# Patient Record
Sex: FEMALE | Race: BLACK OR AFRICAN AMERICAN | Employment: OTHER | URBAN - METROPOLITAN AREA
[De-identification: names, ages, dates, MRNs, and addresses within clinical notes are randomized per-mention and may not be internally consistent; named-entity substitution may affect disease eponyms.]

---

## 2017-10-24 ENCOUNTER — HOSPITAL ENCOUNTER (EMERGENCY)
Age: 47
Discharge: HOME | End: 2017-10-24
Attending: EMERGENCY MEDICINE
Payer: SELF-PAY

## 2017-10-24 ENCOUNTER — APPOINTMENT (OUTPATIENT)
Dept: GENERAL RADIOLOGY | Age: 47
End: 2017-10-24
Attending: EMERGENCY MEDICINE
Payer: SELF-PAY

## 2017-10-24 ENCOUNTER — APPOINTMENT (OUTPATIENT)
Dept: CT IMAGING | Age: 47
End: 2017-10-24
Attending: EMERGENCY MEDICINE
Payer: SELF-PAY

## 2017-10-24 VITALS
BODY MASS INDEX: 27.94 KG/M2 | TEMPERATURE: 98.1 F | SYSTOLIC BLOOD PRESSURE: 136 MMHG | RESPIRATION RATE: 16 BRPM | OXYGEN SATURATION: 100 % | WEIGHT: 148 LBS | HEART RATE: 108 BPM | HEIGHT: 61 IN | DIASTOLIC BLOOD PRESSURE: 99 MMHG

## 2017-10-24 DIAGNOSIS — S00.83XA FACIAL CONTUSION, INITIAL ENCOUNTER: Primary | ICD-10-CM

## 2017-10-24 DIAGNOSIS — S80.02XA CONTUSION OF LEFT KNEE, INITIAL ENCOUNTER: ICD-10-CM

## 2017-10-24 PROCEDURE — 90471 IMMUNIZATION ADMIN: CPT

## 2017-10-24 PROCEDURE — 73564 X-RAY EXAM KNEE 4 OR MORE: CPT

## 2017-10-24 PROCEDURE — 99281 EMR DPT VST MAYX REQ PHY/QHP: CPT

## 2017-10-24 PROCEDURE — 70450 CT HEAD/BRAIN W/O DYE: CPT

## 2017-10-24 PROCEDURE — 74011250636 HC RX REV CODE- 250/636: Performed by: EMERGENCY MEDICINE

## 2017-10-24 PROCEDURE — 90715 TDAP VACCINE 7 YRS/> IM: CPT | Performed by: EMERGENCY MEDICINE

## 2017-10-24 PROCEDURE — 70486 CT MAXILLOFACIAL W/O DYE: CPT

## 2017-10-24 RX ORDER — LISINOPRIL AND HYDROCHLOROTHIAZIDE 20; 25 MG/1; MG/1
TABLET ORAL DAILY
COMMUNITY

## 2017-10-24 RX ORDER — GABAPENTIN 100 MG/1
100 CAPSULE ORAL 2 TIMES DAILY
COMMUNITY

## 2017-10-24 RX ORDER — METFORMIN HYDROCHLORIDE 1000 MG/1
1000 TABLET ORAL 2 TIMES DAILY WITH MEALS
COMMUNITY

## 2017-10-24 RX ADMIN — TETANUS TOXOID, REDUCED DIPHTHERIA TOXOID AND ACELLULAR PERTUSSIS VACCINE, ADSORBED 0.5 ML: 5; 2.5; 8; 8; 2.5 SUSPENSION INTRAMUSCULAR at 14:45
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
## 2017-10-24 NOTE — LETTER
NOTIFICATION RETURN TO WORK 
 
10/24/2017 4:34 PM 
 
Ms. Alba Astudillo [address] [facility] [address] [place] [id number] To Whom It May Concern: 
 
Alba Astudillo is currently under the care of [address] [place] [address] EMERGENCY DEPT. She will return to work on: 10/25/17 If there are questions or concerns please have the patient contact our office.  
 
 
 
Sincerely, 
 
 
 
 
 
 
 
Berkley Corbett MD
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
## 2017-11-02 ENCOUNTER — OFFICE VISIT (OUTPATIENT)
Dept: OBGYN CLINIC | Age: 47
End: 2017-11-02

## 2017-11-02 ENCOUNTER — HOSPITAL ENCOUNTER (OUTPATIENT)
Dept: LAB | Age: 47
Discharge: HOME | End: 2017-11-02
Payer: SELF-PAY

## 2017-11-02 VITALS
HEIGHT: 61 IN | BODY MASS INDEX: 28.13 KG/M2 | HEART RATE: 81 BPM | DIASTOLIC BLOOD PRESSURE: 87 MMHG | SYSTOLIC BLOOD PRESSURE: 134 MMHG | WEIGHT: 149 LBS

## 2017-11-02 DIAGNOSIS — Z01.419 WELL WOMAN EXAM WITH ROUTINE GYNECOLOGICAL EXAM: Primary | ICD-10-CM

## 2017-11-02 DIAGNOSIS — Z12.39 BREAST CANCER SCREENING: ICD-10-CM

## 2017-11-02 DIAGNOSIS — N90.89 VULVAR MASS: ICD-10-CM

## 2017-11-02 LAB
HCG URINE, QL. (POC): NEGATIVE
VALID INTERNAL CONTROL?: YES

## 2017-11-02 PROCEDURE — 87624 HPV HI-RISK TYP POOLED RSLT: CPT | Performed by: OBSTETRICS & GYNECOLOGY

## 2017-11-02 PROCEDURE — 88175 CYTOPATH C/V AUTO FLUID REDO: CPT | Performed by: OBSTETRICS & GYNECOLOGY

## 2017-11-02 PROCEDURE — 87491 CHLMYD TRACH DNA AMP PROBE: CPT | Performed by: OBSTETRICS & GYNECOLOGY

## 2017-11-02 NOTE — PATIENT INSTRUCTIONS

## 2017-11-02 NOTE — PROGRESS NOTES
Subjective:   50 y.o. female for annual routine Pap and checkup. Patient also here for right vulvar mass x >8 months,enlarging and now with 2 new growing lumps on left vulva. PCP placed patient on abx with no relief. Patient's last menstrual period was 2017. Last pap smear:  2 years ago NILM  Menstrual history: irregular  Dysmenorrhea no  H/o STIs:  no  Social History: single partner, contraception - none. [unfilled]  OB History    Para Term  AB Living   6 5       SAB TAB Ectopic Molar Multiple Live Births              # Outcome Date GA Lbr Jesus/2nd Weight Sex Delivery Anes PTL Lv   6             5 Para            4 Para            3 Para            2 Para            1 Para                   Pertinent past medical hstory: no history of HTN, DVT, CAD, liver disease, migraines; +DM, +smoking    There is no problem list on file for this patient. There are no active problems to display for this patient. Current Outpatient Prescriptions   Medication Sig Dispense Refill    insulin NPH/insulin regular (HUMULIN 70/30) 100 unit/mL (70-30) injection 35 Units by SubCUTAneous route two (2) times a day.  lisinopril-hydroCHLOROthiazide (PRINZIDE, ZESTORETIC) 20-25 mg per tablet Take  by mouth daily.  metFORMIN (GLUCOPHAGE) 1,000 mg tablet Take 1,000 mg by mouth two (2) times daily (with meals).  gabapentin (NEURONTIN) 100 mg capsule Take 100 mg by mouth two (2) times a day. No Known Allergies  Past Medical History:   Diagnosis Date    Diabetes [address] [address] [facility]     Gastrointestinal disorder     acid reflux    Hypertension      History reviewed. No pertinent surgical history. Family History   Problem Relation Age of Onset    Diabetes Mother     Liver Disease Father      Social History   Substance Use Topics    Smoking status: Current Every Day Smoker     Packs/day: 1.00    Smokeless tobacco: Never Used    Alcohol use Yes      Comment: occ        ROS:  Feeling well.  No dyspnea or chest pain on exertion. No abdominal pain, change in bowel habits, black or bloody stools. No urinary tract symptoms. GYN ROS: infrequent menses, no pelvic pain or discharge, no breast pain or new or enlarging lumps on self exam. No neurological complaints. Objective:     Visit Vitals    /87    Pulse 81    Ht 5' 1\" (1.549 m)    Wt 149 lb (67.6 kg)    LMP 07/24/2017    BMI 28.15 kg/m2     The patient appears well, alert, oriented x 3, in no distress. ENT normal.  Neck supple. No adenopathy or thyromegaly. TAMMY. Lungs are clear, good air entry, no wheezes, rhonchi or rales. S1 and S2 normal, no murmurs, regular rate and rhythm. Abdomen soft without tenderness, guarding, mass or organomegaly. Extremities show no edema, normal peripheral pulses. Neurological is normal, no focal findings. BREAST EXAM: right breast normal without mass, skin or nipple changes or axillary nodes, left breast normal without mass, skin or nipple changes or axillary nodes    PELVIC EXAM: VULVA: 4x5 cm firm mass on right vulva, 1x2 cm mass on left vulva, 1x1 cm mass on left vulva. VAGINA: normal appearing vagina with normal color and discharge, no lesions, CERVIX: normal appearing cervix without discharge or lesions, UTERUS: uterus is normal size, shape, consistency and nontender, ADNEXA: normal adnexa in size, nontender and no masses, PAP: Pap smear done today, DNA probe for chlamydia and GC obtained    Assessment/Plan:   well woman  mammogram  pap smear  counseled on breast self exam  additional lab tests per orders    ICD-10-CM ICD-9-CM    1. Well woman exam with routine gynecological exam Z01.419 V72.31 PAP IG, CT-NG TV HPV 16&18,45 ([id number], [id number])      AMB POC URINE PREGNANCY TEST, VISUAL COLOR COMPARISON   2. Vulvar mass N90.9 625.8 REFERRAL TO GYN ONCOLOGY   3. Breast cancer screening Z12.31 V76.10 ANNALEE MAMMO BI SCREENING INCL CAD   .   Vulvar biopsy attempted but patient did not tolerate procedure  Called gyn onc office to notify them of the referral and lack of biopsy. I explained to patient that due to the size of the vulvar mass, I will refer to gyn oncology. Discussed with patient that our office will call for abnormal lab results. Normal results can be reviewed through [facility]. If patient has not received a phone call from our office or there are no results found on Mychart, the patient has been instructed to call our office to follow up on results. I have verbalized the plan of care with patient and the patient expressed understanding.    All questions were answered

## 2017-11-02 NOTE — PROCEDURES
Procedure:  Vulvar biopsy    Performed By:  Senait Cifuentes DO     Assistant:  none    Anesthesia: Local     Procedure Details: The risks,benefits and alternatives  were explained and consent was obtained for the procedure. RBAs also explained. The area was cleansed with Betadine and draped in the usual manner. Local anesthesia with 1% lidocaine with epinephrine was infiltrated into the skin overlying the vulvar mass. There was difficulty infiltrating since the mass was extremely firm. Patient was in severe pain during the administration of local anesthetic. Patient asked to terminate the procedure. Estimated Blood Loss:  None           Complications:  Pain           Condition: stable           Signed By: Senait Cifuentes DO     I have verbalized the plan of care with patient. The patient was given a full opportunity to ask questions and indicated that her questions had been answered.

## 2017-11-07 ENCOUNTER — HOSPITAL ENCOUNTER (OUTPATIENT)
Dept: MAMMOGRAPHY | Age: 47
Discharge: HOME | End: 2017-11-07
Attending: OBSTETRICS & GYNECOLOGY
Payer: SELF-PAY

## 2017-11-07 DIAGNOSIS — Z12.39 BREAST CANCER SCREENING: ICD-10-CM

## 2017-11-07 PROCEDURE — 77067 SCR MAMMO BI INCL CAD: CPT

## 2017-11-07 NOTE — PROGRESS NOTES
Pap NILM. HPV NEG  Repeat pap in 5 years or as clinically indicated. ALFONZO Patton to send letter to patient with above recommendation.

## 2017-11-09 ENCOUNTER — TELEPHONE (OUTPATIENT)
Dept: ONCOLOGY | Age: 47
End: 2017-11-09

## 2017-11-13 NOTE — PROGRESS NOTES
[facility]  [address] [place] [address], [address], [address]  [address] [facility], [address] [place] [address]  [facility]   [phone]  Teresita Lynn DO      Patient ID:  Name:  Alfredo Soares  MRN:  [id number]  :  1970/47 y.o. Date:  2017      HISTORY OF PRESENT ILLNESS:  Alfredo Soares is a 50 y.o.   perimenopausal female referred by Dr. Tanner for vulvar mass. Patient initially presented for WWE, with c/o right vulvar mass that she's noticed for the past 8+ months. Patient also reports new onset of 2 lumps on left vulva. Patient initially saw PCP, who prescribed antibiotics, which were completed with no relief. On exam, 4x5 cm firm mass on right vulva, 1x2 cm mass on left vulva, 1x1 cm mass on left vulva   Biopsy of vulvar mass in office at last visit was attempted, but unsuccessful as patient was unable to tolerate the procedure. Patient denies history of STDs, HPV, abnormal paps. Pt states that she has pain at these sites.      Labs:  17 Pap: sat/neg HPV neg      Imaging  none       ROS:   As above      Patient Active Problem List    Diagnosis Date Noted    Constipation 2015    Elevated troponin 2013    Nausea & vomiting 2013    Dehydration 2013    Substance abuse 2013    DM (diabetes mellitus), secondary uncontrolled ([address] 2013    Active smoker 2013     Past Medical History:   Diagnosis Date    Diabetes ([address]     Gastrointestinal disorder     acid reflux    GERD (gastroesophageal reflux disease)     Hypertension     Ill-defined condition     neuropathy      Past Surgical History:   Procedure Laterality Date    COLONOSCOPY  2015           OB History      Para Term  AB Living    6 5 0 0 0     SAB TAB Ectopic Molar Multiple Live Births    0 0 0 0          Social History   Substance Use Topics    Smoking status: Current Every Day Smoker     Packs/day: 1.00    Smokeless tobacco: Never Used    Alcohol use 1.2 oz/week     2 Cans of beer per week      Comment: occ      Family History   Problem Relation Age of Onset    Diabetes Mother     Liver Disease Father       Current Outpatient Prescriptions   Medication Sig    insulin NPH/insulin regular (HUMULIN 70/30) 100 unit/mL (70-30) injection 35 Units by SubCUTAneous route two (2) times a day.  lisinopril-hydroCHLOROthiazide (PRINZIDE, ZESTORETIC) 20-25 mg per tablet Take  by mouth daily.  metFORMIN (GLUCOPHAGE) 1,000 mg tablet Take 1,000 mg by mouth two (2) times daily (with meals).  gabapentin (NEURONTIN) 100 mg capsule Take 100 mg by mouth two (2) times a day.  diphenhydrAMINE (BENADRYL) 25 mg capsule Take 50 mg by mouth every six (6) hours as needed.  hydrOXYzine (VISTARIL) 25 mg capsule Take 1 Cap by mouth three (3) times daily as needed for Itching.  INSULIN NPH HUM/REG INSULIN HM (HUMULIN 70/30 SC) 25 Units by SubCUTAneous route.  LISINOPRIL PO Take  by mouth.  metFORMIN (GLUCOPHAGE) 1,000 mg tablet Take 1,000 mg by mouth daily.  pregabalin (LYRICA) 75 mg capsule Take 75 mg by mouth daily (after dinner).  aspirin 81 mg chewable tablet Take 81 mg by mouth daily.  esomeprazole (NEXIUM) 20 mg capsule Take 20 mg by mouth daily. Indications: GASTROESOPHAGEAL REFLUX     No current facility-administered medications for this visit. No Known Allergies       OBJECTIVE:    Physical Exam  VITAL SIGNS: Visit Vitals    LMP 07/24/2017      GENERAL PETERSON: in no apparent distress and well developed and well nourished   MUSCULOSKEL: no joint tenderness, deformity or swelling   INTEGUMENT:  warm and dry, no rashes or lesions   ABDOMEN . soft, NT, ND, No masses appreciated   EXTREMITIES: extremities normal, atraumatic, no cyanosis or edema   PELVIC: External genitalia: 4-5 cm firm mass in left vulva.  Two 1 cm masses in left vulva   RECTAL: deferred   CHRISTOPHE SURVEY: Cervical, supraclavicular, axillary and inguinal nodes normal.   NEURO: Grossly normal         IMPRESSION/PLAN:  1. Vulvar Masses   -explained that we need to get tissue diagnosis   -given failed office biopsy will plan for wide local excision of bilateral vulvar masses on 11/29 at [facility]   -surgery counseling today   -all of ms. simpson questions/concerns addressed      The total time spent was 60 minutes regarding this patients diagnosis of vulvar masses and >50% of this time was spent counseling and coordinating care    [address] [place] [address]  Gynecologic Oncology  11/13/20173:19 PM

## 2017-11-14 ENCOUNTER — OFFICE VISIT (OUTPATIENT)
Dept: ONCOLOGY | Age: 47
End: 2017-11-14

## 2017-11-14 VITALS
HEIGHT: 62 IN | DIASTOLIC BLOOD PRESSURE: 85 MMHG | TEMPERATURE: 98.1 F | SYSTOLIC BLOOD PRESSURE: 120 MMHG | HEART RATE: 99 BPM | WEIGHT: 158.2 LBS | OXYGEN SATURATION: 95 % | BODY MASS INDEX: 29.11 KG/M2

## 2017-11-14 DIAGNOSIS — N90.89 VULVAR MASS: Primary | ICD-10-CM

## 2017-11-14 NOTE — PROGRESS NOTES
Juan Urena, a 50 y.o. female,  is here for   Chief Complaint   Patient presents with    Referral Follow Up     Dr. Ni Gonzalez; vulvar mass       Visit Vitals    /85 (BP 1 Location: Right arm, BP Patient Position: Sitting)    Pulse 99    Temp 98.1 °F (36.7 °C) (Oral)    Ht 5' 1.5\" (1.562 m)    Wt 71.8 kg (158 lb 3.2 oz)    SpO2 95%    BMI 29.41 kg/m2       Patient denies any abdominal or pelvic pain. No unusual bleeding, discharge, or irritation. No changes in bladder or bowel habits. 1. Have you been to the ER, urgent care clinic since your last visit? Hospitalized since your last visit? Yes When: October 2017 Where: [facility] ED Reason for visit: Vulvar mass    2. Have you seen or consulted any other health care providers outside of the [address] [place] [address] since your last visit? Include any pap smears or colon screening.  new patient

## 2017-11-27 ENCOUNTER — TELEPHONE (OUTPATIENT)
Dept: ONCOLOGY | Age: 47
End: 2017-11-27

## 2017-11-27 NOTE — TELEPHONE ENCOUNTER
Left voicemail for patient to have testing completed for procedure on 11/29/17. Also called work number but it was the [employer] personal cell phone and he was not at work on 11/27/17.

## 2017-11-28 ENCOUNTER — ANESTHESIA EVENT (OUTPATIENT)
Dept: SURGERY | Age: 47
End: 2017-11-28
Payer: SELF-PAY

## 2017-11-28 ENCOUNTER — TELEPHONE (OUTPATIENT)
Dept: ONCOLOGY | Age: 47
End: 2017-11-28

## 2017-11-29 ENCOUNTER — HOSPITAL ENCOUNTER (OUTPATIENT)
Age: 47
Setting detail: OUTPATIENT SURGERY
Discharge: HOME | End: 2017-11-29
Attending: OBSTETRICS & GYNECOLOGY | Admitting: OBSTETRICS & GYNECOLOGY
Payer: SELF-PAY

## 2017-11-29 ENCOUNTER — ANESTHESIA (OUTPATIENT)
Dept: SURGERY | Age: 47
End: 2017-11-29
Payer: SELF-PAY

## 2017-11-29 VITALS
WEIGHT: 151.8 LBS | SYSTOLIC BLOOD PRESSURE: 157 MMHG | RESPIRATION RATE: 20 BRPM | HEART RATE: 77 BPM | DIASTOLIC BLOOD PRESSURE: 92 MMHG | TEMPERATURE: 96.8 F | BODY MASS INDEX: 28.66 KG/M2 | OXYGEN SATURATION: 96 % | HEIGHT: 61 IN

## 2017-11-29 LAB
ABO + RH BLD: NORMAL
AMPHET UR QL SCN: NEGATIVE
ANION GAP SERPL CALC-SCNC: 7 MMOL/L (ref 3–18)
ATRIAL RATE: 92 BPM
BARBITURATES UR QL SCN: NEGATIVE
BASOPHILS # BLD: 0 K/UL (ref 0–0.06)
BASOPHILS NFR BLD: 0 % (ref 0–2)
BENZODIAZ UR QL: NEGATIVE
BLOOD GROUP ANTIBODIES SERPL: NORMAL
BUN SERPL-MCNC: 11 MG/DL (ref 7–18)
BUN/CREAT SERPL: 14 (ref 12–20)
CALCIUM SERPL-MCNC: 9.2 MG/DL (ref 8.5–10.1)
CALCULATED P AXIS, ECG09: 64 DEGREES
CALCULATED R AXIS, ECG10: 2 DEGREES
CALCULATED T AXIS, ECG11: 30 DEGREES
CANNABINOIDS UR QL SCN: POSITIVE
CHLORIDE SERPL-SCNC: 103 MMOL/L (ref 100–108)
CO2 SERPL-SCNC: 26 MMOL/L (ref 21–32)
COCAINE UR QL SCN: NEGATIVE
CREAT SERPL-MCNC: 0.8 MG/DL (ref 0.6–1.3)
DIAGNOSIS, 93000: NORMAL
DIFFERENTIAL METHOD BLD: ABNORMAL
EOSINOPHIL # BLD: 0.4 K/UL (ref 0–0.4)
EOSINOPHIL NFR BLD: 4 % (ref 0–5)
ERYTHROCYTE [DISTWIDTH] IN BLOOD BY AUTOMATED COUNT: 12.3 % (ref 11.6–14.5)
GLUCOSE BLD STRIP.AUTO-MCNC: 100 MG/DL (ref 70–110)
GLUCOSE BLD STRIP.AUTO-MCNC: 112 MG/DL (ref 70–110)
GLUCOSE BLD STRIP.AUTO-MCNC: 172 MG/DL (ref 70–110)
GLUCOSE SERPL-MCNC: 169 MG/DL (ref 74–99)
HCG UR QL: NEGATIVE
HCT VFR BLD AUTO: 39.8 % (ref 35–45)
HDSCOM,HDSCOM: ABNORMAL
HGB BLD-MCNC: 13.9 G/DL (ref 12–16)
LYMPHOCYTES # BLD: 1.7 K/UL (ref 0.9–3.6)
LYMPHOCYTES NFR BLD: 18 % (ref 21–52)
MCH RBC QN AUTO: 33.2 PG (ref 24–34)
MCHC RBC AUTO-ENTMCNC: 34.9 G/DL (ref 31–37)
MCV RBC AUTO: 95 FL (ref 74–97)
METHADONE UR QL: NEGATIVE
MONOCYTES # BLD: 0.4 K/UL (ref 0.05–1.2)
MONOCYTES NFR BLD: 5 % (ref 3–10)
NEUTS SEG # BLD: 6.8 K/UL (ref 1.8–8)
NEUTS SEG NFR BLD: 73 % (ref 40–73)
OPIATES UR QL: NEGATIVE
P-R INTERVAL, ECG05: 154 MS
PCP UR QL: NEGATIVE
PLATELET # BLD AUTO: 287 K/UL (ref 135–420)
PMV BLD AUTO: 10.7 FL (ref 9.2–11.8)
POTASSIUM SERPL-SCNC: 4.4 MMOL/L (ref 3.5–5.5)
Q-T INTERVAL, ECG07: 352 MS
QRS DURATION, ECG06: 74 MS
QTC CALCULATION (BEZET), ECG08: 435 MS
RBC # BLD AUTO: 4.19 M/UL (ref 4.2–5.3)
SODIUM SERPL-SCNC: 136 MMOL/L (ref 136–145)
SPECIMEN EXP DATE BLD: NORMAL
VENTRICULAR RATE, ECG03: 92 BPM
WBC # BLD AUTO: 9.3 K/UL (ref 4.6–13.2)

## 2017-11-29 PROCEDURE — 88309 TISSUE EXAM BY PATHOLOGIST: CPT | Performed by: OBSTETRICS & GYNECOLOGY

## 2017-11-29 PROCEDURE — 74011000250 HC RX REV CODE- 250: Performed by: ANESTHESIOLOGY

## 2017-11-29 PROCEDURE — 74011250637 HC RX REV CODE- 250/637: Performed by: OBSTETRICS & GYNECOLOGY

## 2017-11-29 PROCEDURE — 80307 DRUG TEST PRSMV CHEM ANLYZR: CPT | Performed by: OBSTETRICS & GYNECOLOGY

## 2017-11-29 PROCEDURE — 76060000033 HC ANESTHESIA 1 TO 1.5 HR: Performed by: OBSTETRICS & GYNECOLOGY

## 2017-11-29 PROCEDURE — 74011636637 HC RX REV CODE- 636/637: Performed by: NURSE ANESTHETIST, CERTIFIED REGISTERED

## 2017-11-29 PROCEDURE — 76210000017 HC OR PH I REC 1.5 TO 2 HR: Performed by: OBSTETRICS & GYNECOLOGY

## 2017-11-29 PROCEDURE — 88307 TISSUE EXAM BY PATHOLOGIST: CPT | Performed by: OBSTETRICS & GYNECOLOGY

## 2017-11-29 PROCEDURE — 74011250636 HC RX REV CODE- 250/636: Performed by: ANESTHESIOLOGY

## 2017-11-29 PROCEDURE — 77030032490 HC SLV COMPR SCD KNE COVD -B: Performed by: OBSTETRICS & GYNECOLOGY

## 2017-11-29 PROCEDURE — 74011250636 HC RX REV CODE- 250/636

## 2017-11-29 PROCEDURE — 80048 BASIC METABOLIC PNL TOTAL CA: CPT | Performed by: OBSTETRICS & GYNECOLOGY

## 2017-11-29 PROCEDURE — 74011000250 HC RX REV CODE- 250

## 2017-11-29 PROCEDURE — 85025 COMPLETE CBC W/AUTO DIFF WBC: CPT | Performed by: OBSTETRICS & GYNECOLOGY

## 2017-11-29 PROCEDURE — 74011250636 HC RX REV CODE- 250/636: Performed by: OBSTETRICS & GYNECOLOGY

## 2017-11-29 PROCEDURE — 77030019903 HC CATH URET INT BARD -A: Performed by: OBSTETRICS & GYNECOLOGY

## 2017-11-29 PROCEDURE — 77030011640 HC PAD GRND REM COVD -A: Performed by: OBSTETRICS & GYNECOLOGY

## 2017-11-29 PROCEDURE — 93005 ELECTROCARDIOGRAM TRACING: CPT

## 2017-11-29 PROCEDURE — 77030008683 HC TU ET CUF COVD -A: Performed by: ANESTHESIOLOGY

## 2017-11-29 PROCEDURE — 74011250636 HC RX REV CODE- 250/636: Performed by: NURSE ANESTHETIST, CERTIFIED REGISTERED

## 2017-11-29 PROCEDURE — 81025 URINE PREGNANCY TEST: CPT

## 2017-11-29 PROCEDURE — 77030026438 HC STYL ET INTUB CARD -A: Performed by: ANESTHESIOLOGY

## 2017-11-29 PROCEDURE — 74011000250 HC RX REV CODE- 250: Performed by: OBSTETRICS & GYNECOLOGY

## 2017-11-29 PROCEDURE — 76210000026 HC REC RM PH II 1 TO 1.5 HR: Performed by: OBSTETRICS & GYNECOLOGY

## 2017-11-29 PROCEDURE — 74011250637 HC RX REV CODE- 250/637: Performed by: NURSE ANESTHETIST, CERTIFIED REGISTERED

## 2017-11-29 PROCEDURE — 86900 BLOOD TYPING SEROLOGIC ABO: CPT | Performed by: OBSTETRICS & GYNECOLOGY

## 2017-11-29 PROCEDURE — 82962 GLUCOSE BLOOD TEST: CPT

## 2017-11-29 PROCEDURE — 77030031139 HC SUT VCRL2 J&J -A: Performed by: OBSTETRICS & GYNECOLOGY

## 2017-11-29 PROCEDURE — 76010000149 HC OR TIME 1 TO 1.5 HR: Performed by: OBSTETRICS & GYNECOLOGY

## 2017-11-29 RX ORDER — BUPIVACAINE HYDROCHLORIDE 5 MG/ML
INJECTION, SOLUTION EPIDURAL; INTRACAUDAL AS NEEDED
Status: DISCONTINUED | OUTPATIENT
Start: 2017-11-29 | End: 2017-11-29 | Stop reason: HOSPADM

## 2017-11-29 RX ORDER — HYDROMORPHONE HYDROCHLORIDE 2 MG/ML
0.5 INJECTION, SOLUTION INTRAMUSCULAR; INTRAVENOUS; SUBCUTANEOUS AS NEEDED
Status: DISCONTINUED | OUTPATIENT
Start: 2017-11-29 | End: 2017-11-29 | Stop reason: HOSPADM

## 2017-11-29 RX ORDER — SODIUM CHLORIDE, SODIUM LACTATE, POTASSIUM CHLORIDE, CALCIUM CHLORIDE 600; 310; 30; 20 MG/100ML; MG/100ML; MG/100ML; MG/100ML
75 INJECTION, SOLUTION INTRAVENOUS CONTINUOUS
Status: DISCONTINUED | OUTPATIENT
Start: 2017-11-29 | End: 2017-11-29 | Stop reason: HOSPADM

## 2017-11-29 RX ORDER — INSULIN LISPRO 100 [IU]/ML
INJECTION, SOLUTION INTRAVENOUS; SUBCUTANEOUS ONCE
Status: COMPLETED | OUTPATIENT
Start: 2017-11-29 | End: 2017-11-29

## 2017-11-29 RX ORDER — OXYCODONE AND ACETAMINOPHEN 5; 325 MG/1; MG/1
1 TABLET ORAL ONCE
Status: COMPLETED | OUTPATIENT
Start: 2017-11-29 | End: 2017-11-29

## 2017-11-29 RX ORDER — MAGNESIUM SULFATE 100 %
4 CRYSTALS MISCELLANEOUS AS NEEDED
Status: DISCONTINUED | OUTPATIENT
Start: 2017-11-29 | End: 2017-11-29 | Stop reason: HOSPADM

## 2017-11-29 RX ORDER — ROCURONIUM BROMIDE 10 MG/ML
INJECTION, SOLUTION INTRAVENOUS AS NEEDED
Status: DISCONTINUED | OUTPATIENT
Start: 2017-11-29 | End: 2017-11-29 | Stop reason: HOSPADM

## 2017-11-29 RX ORDER — SODIUM CHLORIDE 0.9 % (FLUSH) 0.9 %
5-10 SYRINGE (ML) INJECTION EVERY 8 HOURS
Status: DISCONTINUED | OUTPATIENT
Start: 2017-11-29 | End: 2017-11-29 | Stop reason: HOSPADM

## 2017-11-29 RX ORDER — NALOXONE HYDROCHLORIDE 0.4 MG/ML
0.1 INJECTION, SOLUTION INTRAMUSCULAR; INTRAVENOUS; SUBCUTANEOUS ONCE
Status: DISCONTINUED | OUTPATIENT
Start: 2017-11-29 | End: 2017-11-29 | Stop reason: HOSPADM

## 2017-11-29 RX ORDER — MIDAZOLAM HYDROCHLORIDE 1 MG/ML
INJECTION, SOLUTION INTRAMUSCULAR; INTRAVENOUS AS NEEDED
Status: DISCONTINUED | OUTPATIENT
Start: 2017-11-29 | End: 2017-11-29 | Stop reason: HOSPADM

## 2017-11-29 RX ORDER — PROPOFOL 10 MG/ML
INJECTION, EMULSION INTRAVENOUS AS NEEDED
Status: DISCONTINUED | OUTPATIENT
Start: 2017-11-29 | End: 2017-11-29 | Stop reason: HOSPADM

## 2017-11-29 RX ORDER — FENTANYL CITRATE 50 UG/ML
INJECTION, SOLUTION INTRAMUSCULAR; INTRAVENOUS AS NEEDED
Status: DISCONTINUED | OUTPATIENT
Start: 2017-11-29 | End: 2017-11-29 | Stop reason: HOSPADM

## 2017-11-29 RX ORDER — DEXTROSE MONOHYDRATE 25 G/50ML
25-50 INJECTION, SOLUTION INTRAVENOUS AS NEEDED
Status: DISCONTINUED | OUTPATIENT
Start: 2017-11-29 | End: 2017-11-29 | Stop reason: HOSPADM

## 2017-11-29 RX ORDER — SODIUM CHLORIDE 0.9 % (FLUSH) 0.9 %
5-10 SYRINGE (ML) INJECTION AS NEEDED
Status: DISCONTINUED | OUTPATIENT
Start: 2017-11-29 | End: 2017-11-29 | Stop reason: HOSPADM

## 2017-11-29 RX ORDER — DIPHENHYDRAMINE HYDROCHLORIDE 50 MG/ML
12.5 INJECTION, SOLUTION INTRAMUSCULAR; INTRAVENOUS ONCE
Status: COMPLETED | OUTPATIENT
Start: 2017-11-29 | End: 2017-11-29

## 2017-11-29 RX ORDER — FAMOTIDINE 20 MG/1
20 TABLET, FILM COATED ORAL ONCE
Status: COMPLETED | OUTPATIENT
Start: 2017-11-29 | End: 2017-11-29

## 2017-11-29 RX ORDER — CEFAZOLIN SODIUM 2 G/50ML
2 SOLUTION INTRAVENOUS ONCE
Status: COMPLETED | OUTPATIENT
Start: 2017-11-29 | End: 2017-11-29

## 2017-11-29 RX ORDER — INSULIN LISPRO 100 [IU]/ML
INJECTION, SOLUTION INTRAVENOUS; SUBCUTANEOUS ONCE
Status: DISCONTINUED | OUTPATIENT
Start: 2017-11-29 | End: 2017-11-29 | Stop reason: HOSPADM

## 2017-11-29 RX ORDER — ONDANSETRON 2 MG/ML
INJECTION INTRAMUSCULAR; INTRAVENOUS AS NEEDED
Status: DISCONTINUED | OUTPATIENT
Start: 2017-11-29 | End: 2017-11-29 | Stop reason: HOSPADM

## 2017-11-29 RX ORDER — LABETALOL HYDROCHLORIDE 5 MG/ML
10 INJECTION, SOLUTION INTRAVENOUS AS NEEDED
Status: DISCONTINUED | OUTPATIENT
Start: 2017-11-29 | End: 2017-11-29 | Stop reason: HOSPADM

## 2017-11-29 RX ORDER — LIDOCAINE HYDROCHLORIDE 20 MG/ML
INJECTION, SOLUTION EPIDURAL; INFILTRATION; INTRACAUDAL; PERINEURAL AS NEEDED
Status: DISCONTINUED | OUTPATIENT
Start: 2017-11-29 | End: 2017-11-29 | Stop reason: HOSPADM

## 2017-11-29 RX ORDER — SUCCINYLCHOLINE CHLORIDE 20 MG/ML
INJECTION INTRAMUSCULAR; INTRAVENOUS AS NEEDED
Status: DISCONTINUED | OUTPATIENT
Start: 2017-11-29 | End: 2017-11-29 | Stop reason: HOSPADM

## 2017-11-29 RX ORDER — DEXTROSE 50 % IN WATER (D50W) INTRAVENOUS SYRINGE
25-50 AS NEEDED
Status: DISCONTINUED | OUTPATIENT
Start: 2017-11-29 | End: 2017-11-29 | Stop reason: HOSPADM

## 2017-11-29 RX ORDER — OXYCODONE AND ACETAMINOPHEN 5; 325 MG/1; MG/1
1-2 TABLET ORAL
Qty: 60 TAB | Refills: 0 | Status: SHIPPED | OUTPATIENT
Start: 2017-11-29 | End: 2018-08-25

## 2017-11-29 RX ADMIN — DIPHENHYDRAMINE HYDROCHLORIDE 12.5 MG: 50 INJECTION INTRAMUSCULAR; INTRAVENOUS at 11:18

## 2017-11-29 RX ADMIN — CEFAZOLIN SODIUM 2 G: 2 SOLUTION INTRAVENOUS at 09:53

## 2017-11-29 RX ADMIN — SODIUM CHLORIDE, SODIUM LACTATE, POTASSIUM CHLORIDE, AND CALCIUM CHLORIDE 75 ML/HR: 600; 310; 30; 20 INJECTION, SOLUTION INTRAVENOUS at 08:05

## 2017-11-29 RX ADMIN — INSULIN LISPRO 3 UNITS: 100 INJECTION, SOLUTION INTRAVENOUS; SUBCUTANEOUS at 08:08

## 2017-11-29 RX ADMIN — LABETALOL HYDROCHLORIDE 10 MG: 5 INJECTION, SOLUTION INTRAVENOUS at 11:17

## 2017-11-29 RX ADMIN — MIDAZOLAM HYDROCHLORIDE 2 MG: 1 INJECTION, SOLUTION INTRAMUSCULAR; INTRAVENOUS at 09:50

## 2017-11-29 RX ADMIN — FAMOTIDINE 20 MG: 20 TABLET, FILM COATED ORAL at 08:02

## 2017-11-29 RX ADMIN — ONDANSETRON 4 MG: 2 INJECTION INTRAMUSCULAR; INTRAVENOUS at 10:19

## 2017-11-29 RX ADMIN — PROPOFOL 180 MG: 10 INJECTION, EMULSION INTRAVENOUS at 09:58

## 2017-11-29 RX ADMIN — ROCURONIUM BROMIDE 5 MG: 10 INJECTION, SOLUTION INTRAVENOUS at 09:58

## 2017-11-29 RX ADMIN — SUCCINYLCHOLINE CHLORIDE 120 MG: 20 INJECTION INTRAMUSCULAR; INTRAVENOUS at 09:58

## 2017-11-29 RX ADMIN — FENTANYL CITRATE 100 MCG: 50 INJECTION, SOLUTION INTRAMUSCULAR; INTRAVENOUS at 09:58

## 2017-11-29 RX ADMIN — OXYCODONE HYDROCHLORIDE AND ACETAMINOPHEN 1 TABLET: 5; 325 TABLET ORAL at 13:08

## 2017-11-29 RX ADMIN — SODIUM CHLORIDE, SODIUM LACTATE, POTASSIUM CHLORIDE, AND CALCIUM CHLORIDE: 600; 310; 30; 20 INJECTION, SOLUTION INTRAVENOUS at 10:05

## 2017-11-29 RX ADMIN — LIDOCAINE HYDROCHLORIDE 40 MG: 20 INJECTION, SOLUTION EPIDURAL; INFILTRATION; INTRACAUDAL; PERINEURAL at 09:58

## 2017-11-29 NOTE — ANESTHESIA POSTPROCEDURE EVALUATION
Post-Anesthesia Evaluation and Assessment    Patient: Shazia Alvarado MRN: [id number]  SSN: [id number]    YOB: 1970  Age: 50 y.o. Sex: female       Cardiovascular Function/Vital Signs  Visit Vitals    BP (!) 157/92 (BP Patient Position: At rest)    Pulse 77    Temp 36 °C (96.8 °F)    Resp 20    Ht 5' 1\" (1.549 m)    Wt 68.9 kg (151 lb 12.8 oz)    SpO2 96%    BMI 28.68 kg/m2       Patient is status post general anesthesia for Procedure(s):  WIDE LOCAL EXCISION OF BILATERAL VULVAR MASSES. Nausea/Vomiting: None    Postoperative hydration reviewed and adequate. Pain:  Pain Scale 1: Numeric (0 - 10) (11/29/17 1203)  Pain Intensity 1: 0 (11/29/17 1203)   Managed    Neurological Status:   Neuro (WDL): Within Defined Limits (11/29/17 1054)   At baseline    Mental Status and Level of Consciousness: Arousable    Pulmonary Status:   O2 Device: Room air (11/29/17 1203)   Adequate oxygenation and airway patent    Complications related to anesthesia: None    Post-anesthesia assessment completed.  No concerns    Signed By: Sabrina Spear MD     November 29, 2017

## 2017-11-29 NOTE — OP NOTES
[address]    Name:  Jada Gtz  MR#:  [id number]  :  1970  Account #:  [de-identified]  Date of Adm:  2017  Date of Surgery:  2017      PREOPERATIVE DIAGNOSIS: Bilateral vulvar masses. POSTOPERATIVE DIAGNOSIS: Bilateral vulvar masses. PROCEDURES PERFORMED: Wide local excision of vulvar masses  x3. SURGEON: Roberth Núñez MD.    ANESTHESIA: General.    FLUIDS: 1350. URINE OUTPUT: Not recorded. ESTIMATED BLOOD LOSS: 25 mL. COMPLICATIONS: None. SPECIMENS REMOVED:  1. Right vulvar mass. 2. Left vulvar mass x2. FINDINGS: Exam under anesthesia revealed a 4 to 5 cm solid mass of  the right vulva. In the left vulva, there were 2 separate 1 cm masses. INDICATIONS: The patient is a 53-year-old who initially presented for  a well woman exam and was complaining of some vulvar masses that  she had noticed over the past 8 months. Exam revealed 4-5 cm firm  mass on the right and two 1 cm masses on the left. A biopsy of this  was attempted, but unsuccessful. She presents today for definitive  surgical management. DESCRIPTION OF PROCEDURE: The patient was taken to the  operating room where general anesthesia was obtained without  difficulty. She was placed in dorsal lithotomy position, prepped and  draped in a sterile fashion. Surgical time-out was taken by the entire  surgical team. At this point, Marcaine 0.5% was injected surrounding  all lesions. An elliptical incision was then made surrounding all the  right-sided lesion and was dissected from its underlying tissues using  monopolar cautery. Hemostasis was achieved with cautery. Interrupted  2-0 Vicryl stitches were used to close the subcutaneous space. The  skin was reapproximated with 3-0 Vicryl horizontal mattress stitches. Attention was then turned to the left side where an elliptical incision  was made surrounding both masses together.  These were then  removed from the underlying attachments using monopolar cautery. The hemostasis was achieved with monopolar cautery. Skin was  closed with 3-0 Vicryl horizontal mattress stitches. Both incisions were  irrigated. There was good hemostasis. The patient tolerated the  procedure well. Sponge count correct x2 and the patient was taken to  recovery room in stable condition.         Renetta Peñaloza MD    [place] / Cyrus Flores  D:  11/29/2017   10:43  T:  11/29/2017   14:02  Job #:  [id number]

## 2017-11-29 NOTE — PERIOP NOTES
Patient armband removed and shredded  Patient blood bank armband removed and shredded    Patient confirmed by two identifiers with discharge instructions prior to being provided to patient and spouse.     Instructed when to safely take next dose of percocet, pt refused to wait to re-assess effectiveness

## 2017-11-29 NOTE — INTERVAL H&P NOTE
H&P Update:  Jemal Brunson was seen and examined. History and physical has been reviewed. The patient has been examined.  There have been no significant clinical changes since the completion of the originally dated History and Physical.    Signed By: Deepak Pete MD     November 29, 2017 7:39 AM

## 2017-11-29 NOTE — H&P (VIEW-ONLY)
[facility]  [address] [place] [address], [address], [address]  [address] [facility], [address] [facility]  [place], [address]   [phone]  Bandar Salguero DO      Patient ID:  Name:  Alba Astudillo  MRN:  [id number]  :  1970/47 y.o. Date:  2017      HISTORY OF PRESENT ILLNESS:  Alba Astudillo is a 50 y.o.   perimenopausal female referred by Dr. Rachell Ye for vulvar mass. Patient initially presented for WWE, with c/o right vulvar mass that she's noticed for the past 8+ months. Patient also reports new onset of 2 lumps on left vulva. Patient initially saw PCP, who prescribed antibiotics, which were completed with no relief. On exam, 4x5 cm firm mass on right vulva, 1x2 cm mass on left vulva, 1x1 cm mass on left vulva   Biopsy of vulvar mass in office at last visit was attempted, but unsuccessful as patient was unable to tolerate the procedure. Patient denies history of STDs, HPV, abnormal paps. Pt states that she has pain at these sites.      Labs:  17 Pap: sat/neg HPV neg      Imaging  none       ROS:   As above      Patient Active Problem List    Diagnosis Date Noted    Constipation 2015    Elevated troponin 2013    Nausea & vomiting 2013    Dehydration 2013    Substance abuse 2013    DM (diabetes mellitus), secondary uncontrolled ([address] 2013    Active smoker 2013     Past Medical History:   Diagnosis Date    Diabetes ([address]     Gastrointestinal disorder     acid reflux    GERD (gastroesophageal reflux disease)     Hypertension     Ill-defined condition     neuropathy      Past Surgical History:   Procedure Laterality Date    COLONOSCOPY  2015           OB History      Para Term  AB Living    6 5 0 0 0     SAB TAB Ectopic Molar Multiple Live Births    0 0 0 0          Social History   Substance Use Topics    Smoking status: Current Every Day Smoker     Packs/day: 1.00    Smokeless tobacco: Never Used    Alcohol use 1.2 oz/week     2 Cans of beer per week      Comment: occ      Family History   Problem Relation Age of Onset    Diabetes Mother     Liver Disease Father       Current Outpatient Prescriptions   Medication Sig    insulin NPH/insulin regular (HUMULIN 70/30) 100 unit/mL (70-30) injection 35 Units by SubCUTAneous route two (2) times a day.  lisinopril-hydroCHLOROthiazide (PRINZIDE, ZESTORETIC) 20-25 mg per tablet Take  by mouth daily.  metFORMIN (GLUCOPHAGE) 1,000 mg tablet Take 1,000 mg by mouth two (2) times daily (with meals).  gabapentin (NEURONTIN) 100 mg capsule Take 100 mg by mouth two (2) times a day.  diphenhydrAMINE (BENADRYL) 25 mg capsule Take 50 mg by mouth every six (6) hours as needed.  hydrOXYzine (VISTARIL) 25 mg capsule Take 1 Cap by mouth three (3) times daily as needed for Itching.  INSULIN NPH HUM/REG INSULIN HM (HUMULIN 70/30 SC) 25 Units by SubCUTAneous route.  LISINOPRIL PO Take  by mouth.  metFORMIN (GLUCOPHAGE) 1,000 mg tablet Take 1,000 mg by mouth daily.  pregabalin (LYRICA) 75 mg capsule Take 75 mg by mouth daily (after dinner).  aspirin 81 mg chewable tablet Take 81 mg by mouth daily.  esomeprazole (NEXIUM) 20 mg capsule Take 20 mg by mouth daily. Indications: GASTROESOPHAGEAL REFLUX     No current facility-administered medications for this visit. No Known Allergies       OBJECTIVE:    Physical Exam  VITAL SIGNS: Visit Vitals    LMP 07/24/2017      GENERAL PETERSON: in no apparent distress and well developed and well nourished   MUSCULOSKEL: no joint tenderness, deformity or swelling   INTEGUMENT:  warm and dry, no rashes or lesions   ABDOMEN . soft, NT, ND, No masses appreciated   EXTREMITIES: extremities normal, atraumatic, no cyanosis or edema   PELVIC: External genitalia: 4-5 cm firm mass in left vulva.  Two 1 cm masses in left vulva   RECTAL: deferred   CHRISTOPHE SURVEY: Cervical, supraclavicular, axillary and inguinal nodes normal.   NEURO: Grossly normal         IMPRESSION/PLAN:  1. Vulvar Masses   -explained that we need to get tissue diagnosis   -given failed office biopsy will plan for wide local excision of bilateral vulvar masses on 11/29 at [facility]   -surgery counseling today   -all of ms. simpson questions/concerns addressed      The total time spent was 60 minutes regarding this patients diagnosis of vulvar masses and >50% of this time was spent counseling and coordinating care    [address] [place] [address]  Gynecologic Oncology  11/13/20173:19 PM

## 2017-11-29 NOTE — IP AVS SNAPSHOT
Jerel Mueller 
 
 
 [address] [facility] [address] [facility] Patient: Jemal Brunson MRN: [id number] UFX:[id number] About your hospitalization You were admitted on:  November 29, 2017 You last received care in the:  [facility] PHASE 2 RECOVERY You were discharged on:  November 29, 2017 Why you were hospitalized Your primary diagnosis was:  Not on File Things You Need To Do (next 8 weeks) Follow up with Epifanio Rodriguez NP Phone:  [phone] Where:  [address] [address] Follow up with Deepak Pete MD in 2 week(s) Phone:  [phone] Where:  [address], [place], [address] Wednesday Dec 27, 2017 POST OP with Deepak Pete MD at  2:00 PM  
Where: [address] ([facility]) Discharge Orders None A check delta indicates which time of day the medication should be taken. My Medications TAKE these medications as instructed Instructions Each Dose to Equal  
 Morning Noon Evening Bedtime  
 aspirin 81 mg chewable tablet Your last dose was: Your next dose is: Take 81 mg by mouth daily. 81 mg  
    
   
   
   
  
 BENADRYL 25 mg capsule Generic drug:  diphenhydrAMINE Your last dose was: Your next dose is: Take 50 mg by mouth every six (6) hours as needed. 50 mg  
    
   
   
   
  
 gabapentin 100 mg capsule Commonly known as:  NEURONTIN Your last dose was: Your next dose is: Take 100 mg by mouth two (2) times a day. 100 mg HumuLIN 70/30 100 unit/mL (70-30) injection Generic drug:  insulin NPH/insulin regular Your last dose was: Your next dose is:    
   
   
 35 Units by SubCUTAneous route two (2) times a day. 35 Units hydrOXYzine pamoate 25 mg capsule Commonly known as:  VISTARIL Your last dose was: Your next dose is: Take 1 Cap by mouth three (3) times daily as needed for Itching. 25 mg  
    
   
   
   
  
 lisinopril-hydroCHLOROthiazide 20-25 mg per tablet Commonly known as:  Katrafaelene Rockers Your last dose was: Your next dose is: Take  by mouth daily. metFORMIN 1,000 mg tablet Commonly known as:  GLUCOPHAGE Your last dose was: Your next dose is: Take 1,000 mg by mouth two (2) times daily (with meals). 1000 mg  
    
   
   
   
  
 oxyCODONE-acetaminophen 5-325 mg per tablet Commonly known as:  PERCOCET Your last dose was: Your next dose is: Take 1-2 Tabs by mouth every four (4) hours as needed for Pain. Max Daily Amount: 12 Tabs. 1-2 Tab Where to Get Your Medications Information on where to get these meds will be given to you by the nurse or doctor. ! Ask your nurse or doctor about these medications  
  oxyCODONE-acetaminophen 5-325 mg per tablet Discharge Instructions You must pee by 7pm on 11/29/17. Drink fluids and walk to promote return of normal functioning. If unable you must return to the nearest emergency room. Skin Lesion Removal: What to Expect at Home Your Recovery After your procedure, you should not have much pain. But some soreness, swelling, or bruising is normal. Your doctor may recommend over-the-counter medicines to help with any discomfort. Most people can return to their normal routine the same day of their procedure. How quickly your wound heals depends on the size of your wound and the type of procedure you had. Most wounds take 1 to 3 weeks to heal. If you had laser surgery, your skin may change color and then slowly return to its normal color. You may need only a bandage, or you may need stitches. If you had stitches, your doctor will probably remove them 5 to 14 days later. If you have the type of stitches that dissolve, they do not have to be removed. They will disappear on their own. This care sheet gives you a general idea about how long it will take for you to recover. But each person recovers at a different pace. Follow the steps below to get better as quickly as possible. How can you care for yourself at home? Activity ? · For the first few days, try not to bump or knock your wound. ? · Depending on where your wound is, you may need to avoid strenuous exercise for 2 weeks after the procedure or until your doctor says it is okay. ? · If you have had a lesion removed from your face, do not use makeup near your wound until you have your stitches taken out. ? · Ask your doctor when it is okay to shower, bathe, or swim. Medicines ? · Your doctor will tell you if and when you can restart your medicines. He or she will also give you instructions about taking any new medicines. ? · If you take blood thinners, such as warfarin (Coumadin), clopidogrel (Plavix), or aspirin, be sure to talk to your doctor. He or she will tell you if and when to start taking those medicines again. Make sure that you understand exactly what your doctor wants you to do. ? · Be safe with medicines. Take pain medicines exactly as directed. ¨ If the doctor gave you a prescription medicine for pain, take it as prescribed. ¨ If you are not taking a prescription pain medicine, ask your doctor if you can take an over-the-counter medicine. ? Wound care ? · If your doctor told you how to care for your incision, follow your doctor's instructions. If you did not get instructions, follow this general advice: ¨ Keep the wound bandaged and dry for the first day.  
¨ After the first 24 to 48 hours, wash around the wound with clean water 2 times a day. Don't use hydrogen peroxide or alcohol, which can slow healing. ¨ You may cover the wound with a thin layer of petroleum jelly, such as Vaseline, and a nonstick bandage. ¨ Apply more petroleum jelly and replace the bandage as needed. ? · If you have stitches, you may get other instructions. ? · If a scab forms, do not pull it off. Let it fall off on its own. Wounds heal faster if no scab forms. Washing the area every day and using petroleum jelly will help prevent a scab from forming. ? · If the wound bleeds, put direct pressure on it with a clean cloth until the bleeding stops. ? · If you had a growth \"frozen\" off, you may get a blister. Do not break it. Let it dry up on its own. It is common for the blister to fill with blood. You do not need to do anything about this, but if it becomes too painful, call your doctor. ? · Avoid the sun until your stitches are removed. Follow-up care is a key part of your treatment and safety. Be sure to make and go to all appointments, and call your doctor if you are having problems. It's also a good idea to know your test results and keep a list of the medicines you take. When should you call for help? Call 911 anytime you think you may need emergency care. For example, call if: 
? · You passed out (lost consciousness). ? · You have severe trouble breathing. ? · You have sudden chest pain and shortness of breath, or you cough up blood. ?Call your doctor now or seek immediate medical care if: 
? · You have symptoms of a blood clot in your leg (called a deep vein thrombosis), such as: 
¨ Pain in the calf, back of the knee, thigh, or groin. ¨ Redness and swelling in your leg or groin. ? · You have signs of infection, such as: 
¨ Increased pain, swelling, warmth, or redness. ¨ Red streaks leading from the wound. ¨ Pus draining from the wound. ¨ A fever. ? · You have pain that does not get better after you take pain medicine. ? · You have loose stitches. ? Watch closely for changes in your health, and be sure to contact your doctor if you have any problems. Where can you learn more? Go to http://aura-alberto.info/. Enter Q228 in the search box to learn more about \"Skin Lesion Removal: What to Expect at Home. \" Current as of: October 13, 2016 Content Version: 11.4 © 6871-1916 [employer]. Care instructions adapted under license by [employer] (which disclaims liability or warranty for this information). If you have questions about a medical condition or this instruction, always ask your healthcare professional. [place] [address] any warranty or liability for your use of this information. Vulvar Pain: Care Instructions Your Care Instructions The vulva is the area around the opening of the vagina. The cause of vulvar pain (vulvodynia) is not always clear, but it may include inflamed nerves, allergies, skin diseases, diabetes, or infection. You may have pain just in the vulva, or it may reach to the rectal area or legs. Vulvar pain can flare up with activities such as sitting on a bicycle, having sex, or inserting a tampon. Follow-up care is a key part of your treatment and safety. Be sure to make and go to all appointments, and call your doctor if you are having problems. It's also a good idea to know your test results and keep a list of the medicines you take. How can you care for yourself at home? · Take your medicines exactly as prescribed. Call your doctor if you think you are having a problem with your medicine. · Relieve itching with a cold water compress or cool baths. Do not scratch the area. · Wear loose-fitting, cotton clothes. Avoid nylon and other fabric that holds moisture close to the skin. This may allow an infection to start. · Do not douche, unless your doctor tells you to.  
· Limit exercise that can irritate the vulva, such as bike riding or horseback riding. · Avoid hot baths, and do not use soaps or bath products to wash your vulvar area. Rinse with water only, and gently pat the area dry. When should you call for help? Call your doctor now or seek immediate medical care if: 
? · You have a new or higher fever. ? · You have unusual vaginal bleeding. ? · You have new or worse belly or pelvic pain. ? · You have vaginal discharge that has increased in amount or smells bad. ? Watch closely for changes in your health, and be sure to contact your doctor if: 
? · You do not get better as expected. Where can you learn more? Go to http://auraTopCat Researchalberto.info/. Enter 0490 [id number] in the search box to learn more about \"Vulvar Pain: Care Instructions. \" Current as of: October 13, 2016 Content Version: 11.4 © 8679-7883 [employer]. Care instructions adapted under license by [employer] (which disclaims liability or warranty for this information). If you have questions about a medical condition or this instruction, always ask your healthcare professional. [place] [address] any warranty or liability for your use of this information. Narcotic-Analgesic/Acetaminophen (Percocet, Norco, Lorcet HD, Lortab 10/325) - (By mouth) Why this medicine is used:  
Relieves pain. Contact a nurse or doctor right away if you have: 
· Extreme weakness, shallow breathing, slow heartbeat · Severe confusion, lightheadedness, dizziness, fainting · Yellow skin or eyes, dark urine or pale stools · Severe constipation, severe stomach pain, nausea, vomiting, loss of appetite · Sweating or cold, clammy skin Common side effects: · Mild constipation, nausea, vomiting · Sleepiness, tiredness · Itching, rash © 2017 [address] Information is for End User's use only and may not be sold, redistributed or otherwise used for commercial purposes. DISCHARGE SUMMARY from Nurse PATIENT INSTRUCTIONS: 
 After general anesthesia or intravenous sedation, for 24 hours or while taking prescription Narcotics: · Limit your activities · Do not drive and operate hazardous machinery · Do not make important personal or business decisions · Do  not drink alcoholic beverages · If you have not urinated within 8 hours after discharge, please contact your surgeon on call. Report the following to your surgeon: 
· Excessive pain, swelling, redness or odor of or around the surgical area · Temperature over 100.5 · Nausea and vomiting lasting longer than 4 hours or if unable to take medications · Any signs of decreased circulation or nerve impairment to extremity: change in color, persistent  numbness, tingling, coldness or increase pain · Any questions *  Please give a list of your current medications to your Primary Care Provider. *  Please update this list whenever your medications are discontinued, doses are 
    changed, or new medications (including over-the-counter products) are added. *  Please carry medication information at all times in case of emergency situations. These are general instructions for a healthy lifestyle: No smoking/ No tobacco products/ Avoid exposure to second hand smoke Surgeon General's Warning:  Quitting smoking now greatly reduces serious risk to your health. Obesity, smoking, and sedentary lifestyle greatly increases your risk for illness A healthy diet, regular physical exercise & weight monitoring are important for maintaining a healthy lifestyle You may be retaining fluid if you have a history of heart failure or if you experience any of the following symptoms:  Weight gain of 3 pounds or more overnight or 5 pounds in a week, increased swelling in our hands or feet or shortness of breath while lying flat in bed. Please call your doctor as soon as you notice any of these symptoms; do not wait until your next office visit. Recognize signs and symptoms of STROKE: 
 
F-face looks uneven A-arms unable to move or move unevenly S-speech slurred or non-existent T-time-call 911 as soon as signs and symptoms begin-DO NOT go Back to bed or wait to see if you get better-TIME IS BRAIN. Warning Signs of HEART ATTACK Call 911 if you have these symptoms: 
? Chest discomfort. Most heart attacks involve discomfort in the center of the chest that lasts more than a few minutes, or that goes away and comes back. It can feel like uncomfortable pressure, squeezing, fullness, or pain. ? Discomfort in other areas of the upper body. Symptoms can include pain or discomfort in one or both arms, the back, neck, jaw, or stomach. ? Shortness of breath with or without chest discomfort. ? Other signs may include breaking out in a cold sweat, nausea, or lightheadedness. Don't wait more than five minutes to call [address]! Fast action can save your life. Calling 911 is almost always the fastest way to get lifesaving treatment. Emergency Medical Services staff can begin treatment when they arrive  up to an hour sooner than if someone gets to the hospital by car. The discharge information has been reviewed with the patient and spouse. The patient and spouse verbalized understanding. Discharge medications reviewed with the patient and spouse and appropriate educational materials and side effects teaching were provided. ___________________________________________________________________________________________________________________________________ Introducing [facility] SERVICES! Renia Naqvi introduces [facility] patient portal. Now you can access parts of your medical record, email your doctor's office, and request medication refills online. 1. In your internet browser, go to [email or web address] 2. Click on the First Time User? Click Here link in the Sign In box.  You will see the New Member Sign Up page. 3. Enter your Tapatap Access Code exactly as it appears below. You will not need to use this code after youve completed the sign-up process. If you do not sign up before the expiration date, you must request a new code. · Tapatap Access Code: Enoch Mueller Expires: 2/1/2018  2:04 PM 
 
4. Enter the last four digits of your Social Security Number (xxxx) and Date of Birth (mm/dd/yyyy) as indicated and click Submit. You will be taken to the next sign-up page. 5. Create a Tapatap ID. This will be your Tapatap login ID and cannot be changed, so think of one that is secure and easy to remember. 6. Create a Tapatap password. You can change your password at any time. 7. Enter your Password Reset Question and Answer. This can be used at a later time if you forget your password. 8. Enter your e-mail address. You will receive e-mail notification when new information is available in [address] 9. Click Sign Up. You can now view and download portions of your medical record. 10. Click the Download Summary menu link to download a portable copy of your medical information. If you have questions, please visit the Frequently Asked Questions section of the Tapatap website. Remember, Tapatap is NOT to be used for urgent needs. For medical emergencies, dial 911. Now available from your iPhone and Android! Unresulted Labs-Please follow up with your PCP about these lab tests Order Current Status EKG, 12 LEAD, INITIAL Preliminary result Providers Seen During Your Hospitalization Provider Specialty Primary office phone Cheryl Florian MD Gynecologic Oncology [phone] Your Primary Care Physician (PCP) Primary Care Physician Office Phone Office Fax Harper Hodgkins [phone] You are allergic to the following No active allergies Recent Documentation Height Weight BMI OB Status Smoking Status 1.549 m 68.9 kg 28.68 kg/m2 Premenopausal Current Every Day Smoker Emergency Contacts Name Discharge Info Relation Home Work Mobile Damián Nguyen DISCHARGE CAREGIVER [3] Spouse [3]   [phone] Faina Luis DISCHARGE CAREGIVER [3] Sister [23]   [phone] Woody Plasencia CAREGIVER [3] Sister [23]   [phone] Jason Rosenbergip [3] [phone] Patient Belongings The following personal items are in your possession at time of discharge: 
  Dental Appliances: None  Visual Aid: None      Home Medications: None   Jewelry: None  Clothing: Shirt, Pants, Undergarments, Footwear, Socks    Other Valuables: None Please provide this summary of care documentation to your next provider. Signatures-by signing, you are acknowledging that this After Visit Summary has been reviewed with you and you have received a copy. Patient Signature:  ____________________________________________________________ Date:  ____________________________________________________________  
  
Ryley Mg Provider Signature:  ____________________________________________________________ Date:  ____________________________________________________________

## 2017-11-29 NOTE — DISCHARGE INSTRUCTIONS
You must pee by 7pm on 11/29/17. Drink fluids and walk to promote return of normal functioning. If unable you must return to the nearest emergency room. Skin Lesion Removal: What to Expect at Home  Your Recovery  After your procedure, you should not have much pain. But some soreness, swelling, or bruising is normal. Your doctor may recommend over-the-counter medicines to help with any discomfort. Most people can return to their normal routine the same day of their procedure. How quickly your wound heals depends on the size of your wound and the type of procedure you had. Most wounds take 1 to 3 weeks to heal. If you had laser surgery, your skin may change color and then slowly return to its normal color. You may need only a bandage, or you may need stitches. If you had stitches, your doctor will probably remove them 5 to 14 days later. If you have the type of stitches that dissolve, they do not have to be removed. They will disappear on their own. This care sheet gives you a general idea about how long it will take for you to recover. But each person recovers at a different pace. Follow the steps below to get better as quickly as possible. How can you care for yourself at home? Activity  ? · For the first few days, try not to bump or knock your wound. ? · Depending on where your wound is, you may need to avoid strenuous exercise for 2 weeks after the procedure or until your doctor says it is okay. ? · If you have had a lesion removed from your face, do not use makeup near your wound until you have your stitches taken out. ? · Ask your doctor when it is okay to shower, bathe, or swim. Medicines  ? · Your doctor will tell you if and when you can restart your medicines. He or she will also give you instructions about taking any new medicines. ? · If you take blood thinners, such as warfarin (Coumadin), clopidogrel (Plavix), or aspirin, be sure to talk to your doctor.  He or she will tell you if and when to start taking those medicines again. Make sure that you understand exactly what your doctor wants you to do. ? · Be safe with medicines. Take pain medicines exactly as directed. ¨ If the doctor gave you a prescription medicine for pain, take it as prescribed. ¨ If you are not taking a prescription pain medicine, ask your doctor if you can take an over-the-counter medicine. ? Wound care  ? · If your doctor told you how to care for your incision, follow your doctor's instructions. If you did not get instructions, follow this general advice:  ¨ Keep the wound bandaged and dry for the first day. ¨ After the first 24 to 48 hours, wash around the wound with clean water 2 times a day. Don't use hydrogen peroxide or alcohol, which can slow healing. ¨ You may cover the wound with a thin layer of petroleum jelly, such as Vaseline, and a nonstick bandage. ¨ Apply more petroleum jelly and replace the bandage as needed. ? · If you have stitches, you may get other instructions. ? · If a scab forms, do not pull it off. Let it fall off on its own. Wounds heal faster if no scab forms. Washing the area every day and using petroleum jelly will help prevent a scab from forming. ? · If the wound bleeds, put direct pressure on it with a clean cloth until the bleeding stops. ? · If you had a growth \"frozen\" off, you may get a blister. Do not break it. Let it dry up on its own. It is common for the blister to fill with blood. You do not need to do anything about this, but if it becomes too painful, call your doctor. ? · Avoid the sun until your stitches are removed. Follow-up care is a key part of your treatment and safety. Be sure to make and go to all appointments, and call your doctor if you are having problems. It's also a good idea to know your test results and keep a list of the medicines you take. When should you call for help? Call 911 anytime you think you may need emergency care.  For example, call if:  ? · You passed out (lost consciousness). ? · You have severe trouble breathing. ? · You have sudden chest pain and shortness of breath, or you cough up blood. ?Call your doctor now or seek immediate medical care if:  ? · You have symptoms of a blood clot in your leg (called a deep vein thrombosis), such as:  ¨ Pain in the calf, back of the knee, thigh, or groin. ¨ Redness and swelling in your leg or groin. ? · You have signs of infection, such as:  ¨ Increased pain, swelling, warmth, or redness. ¨ Red streaks leading from the wound. ¨ Pus draining from the wound. ¨ A fever. ? · You have pain that does not get better after you take pain medicine. ? · You have loose stitches. ? Watch closely for changes in your health, and be sure to contact your doctor if you have any problems. Where can you learn more? Go to http://aura-alberto.info/. Enter Q228 in the search box to learn more about \"Skin Lesion Removal: What to Expect at Home. \"  Current as of: October 13, 2016  Content Version: 11.4  © 6365-8091 [employer]. Care instructions adapted under license by [employer] (which disclaims liability or warranty for this information). If you have questions about a medical condition or this instruction, always ask your healthcare professional. [address] any warranty or liability for your use of this information. Vulvar Pain: Care Instructions  Your Care Instructions  The vulva is the area around the opening of the vagina. The cause of vulvar pain (vulvodynia) is not always clear, but it may include inflamed nerves, allergies, skin diseases, diabetes, or infection. You may have pain just in the vulva, or it may reach to the rectal area or legs. Vulvar pain can flare up with activities such as sitting on a bicycle, having sex, or inserting a tampon. Follow-up care is a key part of your treatment and safety.  Be sure to make and go to all appointments, and call your doctor if you are having problems. It's also a good idea to know your test results and keep a list of the medicines you take. How can you care for yourself at home? · Take your medicines exactly as prescribed. Call your doctor if you think you are having a problem with your medicine. · Relieve itching with a cold water compress or cool baths. Do not scratch the area. · Wear loose-fitting, cotton clothes. Avoid nylon and other fabric that holds moisture close to the skin. This may allow an infection to start. · Do not douche, unless your doctor tells you to. · Limit exercise that can irritate the vulva, such as bike riding or horseback riding. · Avoid hot baths, and do not use soaps or bath products to wash your vulvar area. Rinse with water only, and gently pat the area dry. When should you call for help? Call your doctor now or seek immediate medical care if:  ? · You have a new or higher fever. ? · You have unusual vaginal bleeding. ? · You have new or worse belly or pelvic pain. ? · You have vaginal discharge that has increased in amount or smells bad. ? Watch closely for changes in your health, and be sure to contact your doctor if:  ? · You do not get better as expected. Where can you learn more? Go to http://aura-alberto.info/. Enter 0490 [id number] 07 [id number] in the search box to learn more about \"Vulvar Pain: Care Instructions. \"  Current as of: October 13, 2016  Content Version: 11.4  © 8341-5108 [employer]. Care instructions adapted under license by [employer] (which disclaims liability or warranty for this information). If you have questions about a medical condition or this instruction, always ask your healthcare professional. [address] any warranty or liability for your use of this information.      Narcotic-Analgesic/Acetaminophen (Percocet, Norco, Lorcet HD, Lortab 10/325) - (By mouth)   Why this medicine is used:   Relieves pain. Contact a nurse or doctor right away if you have:  · Extreme weakness, shallow breathing, slow heartbeat  · Severe confusion, lightheadedness, dizziness, fainting  · Yellow skin or eyes, dark urine or pale stools  · Severe constipation, severe stomach pain, nausea, vomiting, loss of appetite  · Sweating or cold, clammy skin     Common side effects:  · Mild constipation, nausea, vomiting  · Sleepiness, tiredness  · Itching, rash  © 2017 [facility] Information is for End User's use only and may not be sold, redistributed or otherwise used for commercial purposes. DISCHARGE SUMMARY from Nurse    PATIENT INSTRUCTIONS:    After general anesthesia or intravenous sedation, for 24 hours or while taking prescription Narcotics:  · Limit your activities  · Do not drive and operate hazardous machinery  · Do not make important personal or business decisions  · Do  not drink alcoholic beverages  · If you have not urinated within 8 hours after discharge, please contact your surgeon on call. Report the following to your surgeon:  · Excessive pain, swelling, redness or odor of or around the surgical area  · Temperature over 100.5  · Nausea and vomiting lasting longer than 4 hours or if unable to take medications  · Any signs of decreased circulation or nerve impairment to extremity: change in color, persistent  numbness, tingling, coldness or increase pain  · Any questions  *  Please give a list of your current medications to your Primary Care Provider. *  Please update this list whenever your medications are discontinued, doses are      changed, or new medications (including over-the-counter products) are added. *  Please carry medication information at all times in case of emergency situations.     These are general instructions for a healthy lifestyle:    No smoking/ No tobacco products/ Avoid exposure to second hand smoke  Surgeon General's Warning:  Quitting smoking now greatly reduces serious risk to your health. Obesity, smoking, and sedentary lifestyle greatly increases your risk for illness    A healthy diet, regular physical exercise & weight monitoring are important for maintaining a healthy lifestyle    You may be retaining fluid if you have a history of heart failure or if you experience any of the following symptoms:  Weight gain of 3 pounds or more overnight or 5 pounds in a week, increased swelling in our hands or feet or shortness of breath while lying flat in bed. Please call your doctor as soon as you notice any of these symptoms; do not wait until your next office visit. Recognize signs and symptoms of STROKE:    F-face looks uneven    A-arms unable to move or move unevenly    S-speech slurred or non-existent    T-time-call 911 as soon as signs and symptoms begin-DO NOT go       Back to bed or wait to see if you get better-TIME IS BRAIN. Warning Signs of HEART ATTACK     Call 911 if you have these symptoms:   Chest discomfort. Most heart attacks involve discomfort in the center of the chest that lasts more than a few minutes, or that goes away and comes back. It can feel like uncomfortable pressure, squeezing, fullness, or pain.  Discomfort in other areas of the upper body. Symptoms can include pain or discomfort in one or both arms, the back, neck, jaw, or stomach.  Shortness of breath with or without chest discomfort.  Other signs may include breaking out in a cold sweat, nausea, or lightheadedness. Don't wait more than five minutes to call 911 - MINUTES MATTER! Fast action can save your life. Calling 911 is almost always the fastest way to get lifesaving treatment. Emergency Medical Services staff can begin treatment when they arrive -- up to an hour sooner than if someone gets to the hospital by car. The discharge information has been reviewed with the patient and spouse.   The patient and spouse verbalized understanding. Discharge medications reviewed with the patient and spouse and appropriate educational materials and side effects teaching were provided.   ___________________________________________________________________________________________________________________________________

## 2017-11-29 NOTE — BRIEF OP NOTE
BRIEF OPERATIVE NOTE    Date of Procedure: 11/29/2017   Preoperative Diagnosis: VULVAR MASS N90.9  Postoperative Diagnosis: VULVAR MASS N90.9    Procedure(s):  WIDE LOCAL EXCISION OF BILATERAL VULVAR MASSES  Surgeon(s) and Role:     * Soni Ward MD - Primary         Assistant Staff:       Surgical Staff:  Circ-1: Miquel Srivastava RN  Scrub Tech-1: Krystal Strickland  Surg Asst-1: Blayne Barros  Event Time In   Incision Start 1016   Incision Close      Anesthesia: General   Estimated Blood Loss: 25  Specimens: * No specimens in log *   Findings: 4 cm firm right sided vulvar mass, two 1 cm vulvar masses on right vulva   Complications: none  Implants: * No implants in log *

## 2017-11-29 NOTE — ANESTHESIA PREPROCEDURE EVALUATION
Anesthetic History     PONV          Review of Systems / Medical History  Patient summary reviewed and pertinent labs reviewed    Pulmonary          Smoker         Neuro/Psych   Within defined limits           Cardiovascular    Hypertension                   GI/Hepatic/Renal     GERD           Endo/Other    Diabetes         Other Findings   Comments:   Risk Factors for Postoperative nausea/vomiting:       History of postoperative nausea/vomiting? NO       Female? YES       Motion sickness? NO       Intended opioid administration for postoperative analgesia? YES      Smoking Abstinence  Current Smoker? YES  Elective Surgery? NO  Seen preoperatively by anesthesiologist or proxy prior to day of surgery? YES  Pt abstained from smoking 24 hours prior to anesthesia?  YES           Physical Exam    Airway  Mallampati: II  TM Distance: 4 - 6 cm  Neck ROM: normal range of motion   Mouth opening: Normal     Cardiovascular  Regular rate and rhythm,  S1 and S2 normal,  no murmur, click, rub, or gallop             Dental    Dentition: Poor dentition     Pulmonary  Breath sounds clear to auscultation               Abdominal  Abdominal exam normal       Other Findings            Anesthetic Plan    ASA: 3  Anesthesia type: general          Induction: Intravenous  Anesthetic plan and risks discussed with: Patient

## 2017-12-01 ENCOUNTER — TELEPHONE (OUTPATIENT)
Dept: ONCOLOGY | Age: 47
End: 2017-12-01

## 2017-12-01 NOTE — TELEPHONE ENCOUNTER
S/p WLE  C/o a lot of pain down there, using Percocet for relief. Recommended frozen peas wrapped in clean towel for local relief as well. No BM yet, \"about to take a laxative\". Recommended picking up colace for regular use, education provided. Patient using peribottle in shower, advised to use with all toileting. Instructions reviewed. Denies fever, n/v, urinary sx's, d/c. Ambulating. Transferred to [facility] 2 week post-op appt, pt to call sooner PRN.

## 2017-12-12 ENCOUNTER — TELEPHONE (OUTPATIENT)
Dept: ONCOLOGY | Age: 47
End: 2017-12-12

## 2017-12-12 NOTE — TELEPHONE ENCOUNTER
Left message on voicemail confirming appointment on 12/13/2017 @ 18 with Dr. Atul Melendez at [id number] [facility].

## 2018-08-25 PROBLEM — I95.9 HYPOTENSION: Status: ACTIVE | Noted: 2018-08-25

## 2018-08-25 PROBLEM — K92.2 ACUTE UPPER GI BLEED: Status: ACTIVE | Noted: 2018-08-25

## 2022-03-19 PROBLEM — K92.2 ACUTE UPPER GI BLEED: Status: ACTIVE | Noted: 2018-08-25

## 2022-03-19 PROBLEM — I95.9 HYPOTENSION: Status: ACTIVE | Noted: 2018-08-25

## 2023-01-10 ENCOUNTER — TRANSCRIBE ORDER (OUTPATIENT)
Dept: SCHEDULING | Age: 53
End: 2023-01-10

## 2023-01-10 DIAGNOSIS — Z12.31 VISIT FOR SCREENING MAMMOGRAM: Primary | ICD-10-CM

## 2023-02-01 DIAGNOSIS — Z12.31 VISIT FOR SCREENING MAMMOGRAM: Primary | ICD-10-CM

## 2023-02-04 DIAGNOSIS — Z12.31 VISIT FOR SCREENING MAMMOGRAM: Primary | ICD-10-CM

## 2023-06-17 PROBLEM — K85.90 PANCREATITIS, UNSPECIFIED PANCREATITIS TYPE: Status: ACTIVE | Noted: 2023-06-17

## 2023-06-18 PROBLEM — R10.84 GENERALIZED ABDOMINAL PAIN: Status: ACTIVE | Noted: 2023-06-18

## 2023-06-18 PROBLEM — E11.649 UNCONTROLLED TYPE 2 DIABETES MELLITUS WITH HYPOGLYCEMIA WITHOUT COMA (HCC): Status: ACTIVE | Noted: 2023-06-18

## (undated) DEVICE — SUTURE VCRL SZ 3-0 L27IN ABSRB UD L26MM SH 1/2 CIR J416H

## (undated) DEVICE — PACK,LITHOTOMY,PK IV,AURORA: Brand: MEDLINE

## (undated) DEVICE — TRAY PREP DRY W/ PREM GLV 2 APPL 6 SPNG 2 UNDPD 1 OVERWRAP

## (undated) DEVICE — KENDALL SCD EXPRESS SLEEVES, KNEE LENGTH, MEDIUM: Brand: KENDALL SCD

## (undated) DEVICE — GOWN,AURORA,NONRNF,XL,30/CS: Brand: MEDLINE

## (undated) DEVICE — SUTURE VCRL SZ 2-0 L27IN ABSRB UD L26MM SH 1/2 CIR J417H

## (undated) DEVICE — Device

## (undated) DEVICE — 3L THIN WALL CAN: Brand: CRD

## (undated) DEVICE — REM POLYHESIVE ADULT PATIENT RETURN ELECTRODE: Brand: VALLEYLAB

## (undated) DEVICE — Z INACTIVE USE 2527070 DRAPE SURG W40XL44IN UNDERBUTTOCK SMS POLYPR W/ PCH BK DISP

## (undated) DEVICE — BSHR MAJOR BASIN PACK-LF: Brand: MEDLINE INDUSTRIES, INC.

## (undated) DEVICE — INTENDED FOR TISSUE SEPARATION, AND OTHER PROCEDURES THAT REQUIRE A SHARP SURGICAL BLADE TO PUNCTURE OR CUT.: Brand: BARD-PARKER ® CARBON RIB-BACK BLADES

## (undated) DEVICE — MATERNITY PAD,HEAVY: Brand: CURITY